# Patient Record
Sex: FEMALE | Race: WHITE
[De-identification: names, ages, dates, MRNs, and addresses within clinical notes are randomized per-mention and may not be internally consistent; named-entity substitution may affect disease eponyms.]

---

## 2019-01-02 ENCOUNTER — HOSPITAL ENCOUNTER (EMERGENCY)
Dept: HOSPITAL 74 - JER | Age: 39
Discharge: HOME | End: 2019-01-02
Payer: COMMERCIAL

## 2019-01-02 VITALS — BODY MASS INDEX: 36.8 KG/M2

## 2019-01-02 VITALS — HEART RATE: 76 BPM | SYSTOLIC BLOOD PRESSURE: 117 MMHG | DIASTOLIC BLOOD PRESSURE: 70 MMHG

## 2019-01-02 VITALS — TEMPERATURE: 99.4 F

## 2019-01-02 DIAGNOSIS — R00.2: Primary | ICD-10-CM

## 2019-01-02 LAB
ALBUMIN SERPL-MCNC: 3.9 G/DL (ref 3.4–5)
ALP SERPL-CCNC: 113 U/L (ref 45–117)
ALT SERPL-CCNC: 21 U/L (ref 13–61)
ANION GAP SERPL CALC-SCNC: 7 MMOL/L (ref 8–16)
APPEARANCE UR: (no result)
AST SERPL-CCNC: 20 U/L (ref 15–37)
BACTERIA #/AREA URNS HPF: (no result) /HPF
BASOPHILS # BLD: 0.4 % (ref 0–2)
BILIRUB SERPL-MCNC: 0.4 MG/DL (ref 0.2–1)
BILIRUB UR STRIP.AUTO-MCNC: NEGATIVE MG/DL
BUN SERPL-MCNC: 9 MG/DL (ref 7–18)
CALCIUM SERPL-MCNC: 8.5 MG/DL (ref 8.5–10.1)
CHLORIDE SERPL-SCNC: 107 MMOL/L (ref 98–107)
CO2 SERPL-SCNC: 23 MMOL/L (ref 21–32)
COLOR UR: (no result)
CREAT SERPL-MCNC: 0.8 MG/DL (ref 0.55–1.3)
DEPRECATED RDW RBC AUTO: 13.7 % (ref 11.6–15.6)
EOSINOPHIL # BLD: 0.1 % (ref 0–4.5)
EPITH CASTS URNS QL MICRO: (no result) /HPF
GLUCOSE SERPL-MCNC: 129 MG/DL (ref 74–106)
HCT VFR BLD CALC: 37.9 % (ref 32.4–45.2)
HGB BLD-MCNC: 13.4 GM/DL (ref 10.7–15.3)
INR BLD: 1.03 (ref 0.83–1.09)
KETONES UR QL STRIP: NEGATIVE
LEUKOCYTE ESTERASE UR QL STRIP.AUTO: (no result)
LYMPHOCYTES # BLD: 17.1 % (ref 8–40)
MAGNESIUM SERPL-MCNC: 2.2 MG/DL (ref 1.8–2.4)
MCH RBC QN AUTO: 30.3 PG (ref 25.7–33.7)
MCHC RBC AUTO-ENTMCNC: 35.3 G/DL (ref 32–36)
MCV RBC: 85.8 FL (ref 80–96)
MONOCYTES # BLD AUTO: 2.8 % (ref 3.8–10.2)
NEUTROPHILS # BLD: 79.6 % (ref 42.8–82.8)
NITRITE UR QL STRIP: NEGATIVE
PH UR: 6 [PH] (ref 5–8)
PLATELET # BLD AUTO: 330 K/MM3 (ref 134–434)
PMV BLD: 9.7 FL (ref 7.5–11.1)
POTASSIUM SERPLBLD-SCNC: 3.8 MMOL/L (ref 3.5–5.1)
PROT SERPL-MCNC: 8 G/DL (ref 6.4–8.2)
PROT UR QL STRIP: NEGATIVE
PROT UR QL STRIP: NEGATIVE
PT PNL PPP: 12.1 SEC (ref 9.7–13)
RBC # BLD AUTO: 4.41 M/MM3 (ref 3.6–5.2)
SODIUM SERPL-SCNC: 137 MMOL/L (ref 136–145)
SP GR UR: 1 (ref 1.01–1.03)
UROBILINOGEN UR STRIP-MCNC: NEGATIVE MG/DL (ref 0.2–1)
WBC # BLD AUTO: 7.6 K/MM3 (ref 4–10)

## 2019-01-02 PROCEDURE — 3E033GC INTRODUCTION OF OTHER THERAPEUTIC SUBSTANCE INTO PERIPHERAL VEIN, PERCUTANEOUS APPROACH: ICD-10-PCS

## 2019-01-02 PROCEDURE — 3E033NZ INTRODUCTION OF ANALGESICS, HYPNOTICS, SEDATIVES INTO PERIPHERAL VEIN, PERCUTANEOUS APPROACH: ICD-10-PCS

## 2019-01-02 NOTE — PDOC
Rapid Medical Evaluation


Time Seen by Provider: 01/02/19 15:25


Medical Evaluation: 


 Allergies











Allergy/AdvReac Type Severity Reaction Status Date / Time


 


No Known Allergies Allergy   Verified 05/26/17 13:48











01/02/19 15:25


I have performed a brief in-person evaluation of this patient.





The patient presents with a chief complaint of: palpitations x2 weeks





Pertinent physical exam findings: tremulous. Skin warm and dry. 





I have ordered the following: cardiac workup





The patient will proceed to the ED for further evaluation.





**Discharge Disposition





- Diagnosis


 Palpitations








- Referrals





- Patient Instructions





- Post Discharge Activity

## 2019-01-02 NOTE — EKG
Test Reason : 

Blood Pressure : ***/*** mmHG

Vent. Rate : 101 BPM     Atrial Rate : 101 BPM

   P-R Int : 116 ms          QRS Dur : 074 ms

    QT Int : 348 ms       P-R-T Axes : 026 010 028 degrees

   QTc Int : 451 ms

 

SINUS TACHYCARDIA

OTHERWISE NORMAL ECG

NO PREVIOUS ECGS AVAILABLE

Confirmed by SELMA MCDONALD MD ([id number]) on 1/2/2019 6:00:10 PM

 

Referred By:             Confirmed By:SELMA MCDONALD MD

## 2019-01-02 NOTE — PDOC
History of Present Illness





- General


History Source: Patient


Exam Limitations: No Limitations





- History of Present Illness


Initial Comments: 





01/02/19 17:17


The patient a 38-year-old female, with no past medical history, who presents to 

the ED for evaluation of 3 weeks of lightheadedness and palpitations. The 

patient states that the palpitations began after drinking a cup of coffee 

today. She reports that she normally drinks 2 cups per week and has never 

experienced palpitations before. She reports associated LT-sided chest pain 

that is reproducible with palpation and profuse sweating of the palms and feet. 

The patient also reports experiencing nausea, diarrhea x4, and left lower 

quadrant pain today. She reports recent loss of appetite due to the nausea. 

Last menstrual period was on 12/28; the patient is not on any birth 

contraceptives. 





The patient denies any fevers or chills. Denies any dysuria, frequency, urgency

, hesitancy, or hematuria. 





Allergies: NKA


Surgical History: cholecystectomy. 


Social History: None reported. 








<Jaquelin Downs - Last Filed: 01/02/19 17:23>





<Genesis Stringer - Last Filed: 01/02/19 19:05>





- General


Chief Complaint: Palpitations


Stated Complaint: Palpitations


Time Seen by Provider: 01/02/19 15:25





Past History





<Jaquelin Downs - Last Filed: 01/02/19 17:23>





- Past Medical History


COPD: No





- Suicide/Smoking/Psychosocial Hx


Smoking History: Never smoked


Have you smoked in the past 12 months: No


Information on smoking cessation initiated: No


Hx Alcohol Use: No


Drug/Substance Use Hx: No





<Genesis Stringer - Last Filed: 01/02/19 19:05>





- Past Medical History


Allergies/Adverse Reactions: 


 Allergies











Allergy/AdvReac Type Severity Reaction Status Date / Time


 


No Known Allergies Allergy   Verified 01/02/19 15:33











Home Medications: 


Ambulatory Orders





NK [No Known Home Medication]  01/02/19 











**Review of Systems





- Review of Systems


Able to Perform ROS?: Yes


Comments:: 


01/02/19 17:17


GENERAL/CONSTITUTIONAL: (+)Diaphoretic, loss of appetite. No fever or chills. 

No weakness.


HEAD, EYES, EARS, NOSE AND THROAT: No change in vision. No ear pain or 

discharge. No sore throat.


CARDIOVASCULAR: (+)Chest pain, palpitations, lightheadedness. No shortness of 

breath.


RESPIRATORY: No cough, wheezing, or hemoptysis.


GASTROINTESTINAL: (+)Abdominal pain, nausea, diarrhea. No vomiting.


GENITOURINARY: No dysuria, frequency, or change in urination.


MUSCULOSKELETAL: (+)Back pain. No joint or muscle swelling or pain. No neck 

pain.


SKIN: No rash


NEUROLOGIC: No headache, vertigo, loss of consciousness, or change in strength/

sensation.


ENDOCRINE: No increased thirst. No abnormal weight change.


HEMATOLOGIC/LYMPHATIC: No anemia, easy bleeding, or history of blood clots.


ALLERGIC/IMMUNOLOGIC: No hives or skin allergy.








<Jaquelin Downs - Last Filed: 01/02/19 17:23>





*Physical Exam





- Vital Signs


 Last Vital Signs











Temp Pulse Resp BP Pulse Ox


 


 99.4 F   76   18   117/70   97 


 


 01/02/19 15:31  01/02/19 17:11  01/02/19 17:11  01/02/19 17:11  01/02/19 17:11














- Physical Exam


Comments: 


01/02/19 17:23


GENERAL: Awake, alert, and fully oriented, in no acute distress


HEAD: No signs of trauma


EYES: PERRLA, EOMI, sclera anicteric, conjunctiva clear


ENT: (+)Dry mucous membranes. Auricles normal inspection, hearing grossly normal

, nares patent, oropharynx clear without 


exudates. 


NECK: Normal ROM, supple, no lymphadenopathy, JVD, or masses


LUNGS: Breath sounds equal, clear to auscultation bilaterally.  No wheezes, and 

no crackles


HEART: (+)Tachy. Normal S1 and S2, no murmurs, rubs or gallops


ABDOMEN: Soft, nontender, normoactive bowel sounds.  No guarding, no rebound.  

No masses


EXTREMITIES: Normal range of motion, no edema.  No clubbing or cyanosis. No 

cords, erythema, or tenderness


NEUROLOGICAL: Cranial nerves II through XII grossly intact.  Normal speech.


SKIN: Warm, Dry, normal turgor, no rashes or lesions noted








<Jaquelin Downs - Last Filed: 01/02/19 17:23>





- Vital Signs


 Last Vital Signs











Temp Pulse Resp BP Pulse Ox


 


 99.4 F   91 H  18   119/70   99 


 


 01/02/19 15:31  01/02/19 16:23  01/02/19 16:23  01/02/19 16:23  01/02/19 16:23














<Genesis Stringer - Last Filed: 01/02/19 19:05>





Moderate Sedation





- Procedure Monitoring


Vital Signs: 


Procedure Monitoring Vital Signs











Temperature  99.4 F   01/02/19 15:31


 


Pulse Rate  76   01/02/19 17:11


 


Respiratory Rate  18   01/02/19 17:11


 


Blood Pressure  117/70   01/02/19 17:11


 


O2 Sat by Pulse Oximetry (%)  97   01/02/19 17:11











<Jaquelin Downs - Last Filed: 01/02/19 17:23>





- Procedure Monitoring


Vital Signs: 


Procedure Monitoring Vital Signs











Temperature  99.4 F   01/02/19 15:31


 


Pulse Rate  91 H  01/02/19 16:23


 


Respiratory Rate  18   01/02/19 16:23


 


Blood Pressure  119/70   01/02/19 16:23


 


O2 Sat by Pulse Oximetry (%)  99   01/02/19 16:23











<Genesis Stringer - Last Filed: 01/02/19 19:05>





**Heart Score/ECG Review





- ECG Intrepretation


Comment:: 





01/02/19 17:10


sinus tach at 101, nl axis, nl interval, no acute st.t wave findings, no r 

heart strain pattern





<Genesis Stringer - Last Filed: 01/02/19 19:05>





ED Treatment Course





- LABORATORY


CBC & Chemistry Diagram: 


 01/02/19 15:48





 01/02/19 15:48





- ADDITIONAL ORDERS


Additional order review: 


 Laboratory  Results











  01/02/19 01/02/19 01/02/19





  15:55 15:48 15:48


 


PT with INR    12.10


 


INR    1.03


 


Sodium   137 


 


Potassium   3.8 


 


Chloride   107 


 


Carbon Dioxide   23 


 


Anion Gap   7 L 


 


BUN   9 


 


Creatinine   0.8 


 


Creat Clearance w eGFR   > 60 


 


Random Glucose   129 H 


 


Calcium   8.5 


 


Magnesium   2.2 


 


Total Bilirubin   0.4 


 


AST   20 


 


ALT   21 


 


Alkaline Phosphatase   113 


 


Creatine Kinase   178 


 


Creatine Kinase Index   0.5 


 


CK-MB (CK-2)   < 1.0 


 


Troponin I   < 0.02 


 


Total Protein   8.0 


 


Albumin   3.9 


 


TSH   0.97 


 


Urine Color  Straw  


 


Urine Appearance  Cloudy  


 


Urine pH  6.0  


 


Ur Specific Gravity  1.003 L  


 


Urine Protein  Negative  


 


Urine Glucose (UA)  Negative  


 


Urine Ketones  Negative  


 


Urine Blood  1+ H  


 


Urine Nitrite  Negative  


 


Urine Bilirubin  Negative  


 


Urine Urobilinogen  Negative  


 


Ur Leukocyte Esterase  2+ H  


 


Urine WBC (Auto)  5  


 


Urine RBC (Auto)  1  


 


Ur Epithelial Cells  Few  


 


Urine Bacteria  Rare  


 


Urine HCG, Qual  Negative  








 











  01/02/19





  15:48


 


RBC  4.41


 


MCV  85.8


 


MCHC  35.3


 


RDW  13.7


 


MPV  9.7


 


Neutrophils %  79.6


 


Lymphocytes %  17.1


 


Monocytes %  2.8 L


 


Eosinophils %  0.1


 


Basophils %  0.4














- Medications


Given in the ED: 


ED Medications














Discontinued Medications














Generic Name Dose Route Start Last Admin





  Trade Name Yairq  PRN Reason Stop Dose Admin


 


Aspirin  162 mg  01/02/19 15:26  01/02/19 15:42





  Asa -  PO  01/02/19 15:27  162 mg





  ONCE ONE   Administration





     





     





     





     


 


Sodium Chloride  1,000 ml  01/02/19 16:44  01/02/19 16:47





  Normal Saline -  IV  01/02/19 16:45  1,000 ml





  ONCE ONE   Administration





     





     





     





     














<Jaquelin Downs - Last Filed: 01/02/19 17:23>





- LABORATORY


CBC & Chemistry Diagram: 


 01/02/19 15:48





 01/02/19 15:48





- ADDITIONAL ORDERS


Additional order review: 


 Laboratory  Results











  01/02/19 01/02/19 01/02/19





  15:55 15:48 15:48


 


PT with INR    12.10


 


INR    1.03


 


Sodium   137 


 


Potassium   3.8 


 


Chloride   107 


 


Carbon Dioxide   23 


 


Anion Gap   7 L 


 


BUN   9 


 


Creatinine   0.8 


 


Creat Clearance w eGFR   > 60 


 


Random Glucose   129 H 


 


Calcium   8.5 


 


Magnesium   2.2 


 


Total Bilirubin   0.4 


 


AST   20 


 


ALT   21 


 


Alkaline Phosphatase   113 


 


Creatine Kinase   178 


 


Creatine Kinase Index   0.5 


 


CK-MB (CK-2)   < 1.0 


 


Troponin I   < 0.02 


 


Total Protein   8.0 


 


Albumin   3.9 


 


TSH   0.97 


 


Urine Color  Straw  


 


Urine Appearance  Cloudy  


 


Urine pH  6.0  


 


Ur Specific Gravity  1.003 L  


 


Urine Protein  Negative  


 


Urine Glucose (UA)  Negative  


 


Urine Ketones  Negative  


 


Urine Blood  1+ H  


 


Urine Nitrite  Negative  


 


Urine Bilirubin  Negative  


 


Urine Urobilinogen  Negative  


 


Ur Leukocyte Esterase  2+ H  


 


Urine HCG, Qual  Negative  








 











  01/02/19





  15:48


 


RBC  4.41


 


MCV  85.8


 


MCHC  35.3


 


RDW  13.7


 


MPV  9.7


 


Neutrophils %  79.6


 


Lymphocytes %  17.1


 


Monocytes %  2.8 L


 


Eosinophils %  0.1


 


Basophils %  0.4














- Medications


Given in the ED: 


ED Medications














Discontinued Medications














Generic Name Dose Route Start Last Admin





  Trade Name Miracle  PRN Reason Stop Dose Admin


 


Aspirin  162 mg  01/02/19 15:26  01/02/19 15:42





  Asa -  PO  01/02/19 15:27  162 mg





  ONCE ONE   Administration





     





     





     





     


 


Sodium Chloride  1,000 ml  01/02/19 16:44  01/02/19 16:47





  Normal Saline -  IV  01/02/19 16:45  1,000 ml





  ONCE ONE   Administration





     





     





     





     














<Genesis Stringer - Last Filed: 01/02/19 19:05>





Medical Decision Making





- Medical Decision Making





01/02/19 17:11


a/p: 39yo female with palpitations since drinking coffee earlier today


-also with 4 episodes of watery stool


-no recent travel or  abx


-+nausea, no vomiting


-no blood in stool


-has had episodes of palpitations x 3 weeks 


-no ha


-today L sided cp, no radiation-states chest feels itchy


-will send labs, dimer, tsh


-will hydrate with IVF hydraiton


-suspect viral syndrome causing diarrhea today


-will monitor and reassess


01/02/19 18:11


re-eval: pt feeling much better


no nausea


no abd pain


no longer with palpitations


discussed labs, thyroid normal, blood counts normal, 


discussed glucose and repeat of fasting glucose needed


discussed pending dimer


discussed follow up with the medicine clinic and with cardiology


answered all quesitons


no cp at this time


discussed caffeine and palpitations and hydration - need to drink more water





01/02/19 19:04


dimer negative


pt feeling better


stable for dc to home


appt made with the clinic for follow up





<Genesis Stringer - Last Filed: 01/02/19 19:05>





*DC/Admit/Observation/Transfer





- Attestations


Scribe Attestion: 


01/02/19 17:26





Documentation prepared by Jaquelin Downs, acting as medical scribe for Genesis Stringer DO.





<Jaquelin Downs - Last Filed: 01/02/19 17:23>





- Discharge Dispostion


Decision to Admit order: No





- Attestations


Physician Attestion: 





01/02/19 18:15








I, Dr. Genesis Kurkowski, DO, attest that this document has been prepared under 

my direction and personally reviewed by me in its entirety.   I further attest, 

that it accurately reflects all work, treatment, procedures and medical decision

-making performed by me.  





<Genesis Stringer - Last Filed: 01/02/19 19:05>


Diagnosis at time of Disposition: 


 Palpitations








- Discharge Dispostion


Disposition: HOME


Condition at time of disposition: Stable





- Referrals


Referrals: 


Kurt Carias MD [Staff Physician] - 


Virgilio Hinojosa MD [Staff Physician] - 





- Patient Instructions


Printed Discharge Instructions:  DI for Palpitations


Additional Instructions: 


Please follow up with the PMD. Please also make an appointment to follow up 

with the cardiologist in the next 2-3 days. Please drink plenty of fluids. 

Please watch your caffeine intake. Please return to the ED with any further 

concerns or complaints.